# Patient Record
Sex: FEMALE
[De-identification: names, ages, dates, MRNs, and addresses within clinical notes are randomized per-mention and may not be internally consistent; named-entity substitution may affect disease eponyms.]

---

## 2018-12-30 ENCOUNTER — HOSPITAL ENCOUNTER (OUTPATIENT)
Dept: HOSPITAL 53 - M SFHCLERA | Age: 33
End: 2018-12-30
Attending: NURSE PRACTITIONER
Payer: COMMERCIAL

## 2018-12-30 DIAGNOSIS — J01.90: Primary | ICD-10-CM

## 2019-01-01 ENCOUNTER — HOSPITAL ENCOUNTER (EMERGENCY)
Dept: HOSPITAL 53 - M ED | Age: 34
Discharge: HOME | End: 2019-01-01
Payer: COMMERCIAL

## 2019-01-01 VITALS — DIASTOLIC BLOOD PRESSURE: 71 MMHG | SYSTOLIC BLOOD PRESSURE: 114 MMHG

## 2019-01-01 VITALS — HEIGHT: 69 IN | BODY MASS INDEX: 25.68 KG/M2 | WEIGHT: 173.37 LBS

## 2019-01-01 DIAGNOSIS — R22.0: Primary | ICD-10-CM

## 2019-01-01 NOTE — REP
Clinical:  Swelling.

 

Technique:  Axial noncontrast images through the facial bones to include the

mandible with coronal and sagittal re-formations.

 

Findings:

The osseous structures are intact and there is no evidence for fracture or

dislocation.  Specifically, the bilateral zygomatic arches, nasal bones, and

mandible including bilateral temporomandibular joints appear normal and

symmetric.  The sinuses and mastoid air cells are all well aerated and clear

without fluid level to suggest occult trauma.  The bilateral orbits including the

globes and intraconal contents appear symmetric and normal.  The surrounding soft

tissues are grossly unremarkable.

 

Impression:

Normal maxillofacial CT.

No evidence for acute pathology or sinus disease.

 

 

Electronically Signed by

Drake Manzo MD 01/01/2019 09:49 A

## 2019-08-23 ENCOUNTER — HOSPITAL ENCOUNTER (OUTPATIENT)
Dept: HOSPITAL 53 - M RAD | Age: 34
End: 2019-08-23
Attending: PHYSICIAN ASSISTANT
Payer: OTHER GOVERNMENT

## 2019-08-23 DIAGNOSIS — N64.52: Primary | ICD-10-CM

## 2019-08-23 NOTE — REP
BILATERAL MAMMOGRAM WITH RIGHT BREAST ULTRASOUND:

 

HISTORY:  Right nipple discharge, white and foul-smelling for the past 2 months

with pain upper outer quadrant right breast.

 

MLO and CC views of bilateral breasts performed. There are no prior studies for

comparison. No family history of breast cancer. Tyrer-zi lifetime risk of

breast cancer 12.1%.

 

Dense fibroglandular tissue is seen bilaterally in a heterogeneous pattern. No

discrete mass or architectural distortion is seen. No clustered

microcalcifications are seen.

 

Real-time sonographic evaluation of right retroareolar region performed to

evaluate for possible ductal abnormality as a reason for right nipple discharge.

Ultrasound also performed of the upper outer quadrant of the right breast where

there is pain. No discrete cystic or solid nodule is seen in these regions. Dense

fibroglandular tissue is seen.

 

IMPRESSION:

 

BIRADS 2: BI-RADS/ACR category 2 mammogram.  Benign Findings.

 

ACR 2 benign. No mass or clustered microcalcifications. Dense breast parenchyma

limits the sensitivity of the mammogram. No sonographic abnormality in the right

retroareolar region or upper outer quadrant of the right breast. Clinical

correlation and followup recommended.

 

This mammogram was interpreted with the aid of an FDA-approved computer-aided

detection system.

 

The patient states that she or he has not had a clinical breast exam in over a

year.

 

The patient letter being requested is M2.

 

 

Electronically Signed by

Srinivasa Chau MD 08/25/2019 10:51 P

## 2019-09-13 ENCOUNTER — HOSPITAL ENCOUNTER (OUTPATIENT)
Dept: HOSPITAL 53 - M SFHCWAGY | Age: 34
End: 2019-09-13
Attending: NURSE PRACTITIONER
Payer: OTHER GOVERNMENT

## 2019-09-13 DIAGNOSIS — Z12.4: Primary | ICD-10-CM

## 2019-09-13 PROCEDURE — 87624 HPV HI-RISK TYP POOLED RSLT: CPT

## 2019-09-16 LAB — HPV LOW VOL RFLX: (no result)

## 2020-01-22 ENCOUNTER — HOSPITAL ENCOUNTER (OUTPATIENT)
Dept: HOSPITAL 53 - M SFHCWAGY | Age: 35
End: 2020-01-22
Attending: NURSE PRACTITIONER
Payer: COMMERCIAL

## 2020-01-22 DIAGNOSIS — N90.89: Primary | ICD-10-CM

## 2020-06-24 ENCOUNTER — HOSPITAL ENCOUNTER (EMERGENCY)
Dept: HOSPITAL 53 - M ED | Age: 35
Discharge: HOME | End: 2020-06-24
Payer: OTHER GOVERNMENT

## 2020-06-24 VITALS — BODY MASS INDEX: 27.16 KG/M2 | HEIGHT: 69 IN | WEIGHT: 183.38 LBS

## 2020-06-24 VITALS — DIASTOLIC BLOOD PRESSURE: 74 MMHG | SYSTOLIC BLOOD PRESSURE: 122 MMHG

## 2020-06-24 DIAGNOSIS — F43.0: Primary | ICD-10-CM

## 2020-06-24 DIAGNOSIS — F17.200: ICD-10-CM

## 2020-06-24 LAB
APTT BLD: 32.4 SECONDS (ref 25–38.4)
BUN SERPL-MCNC: 4 MG/DL (ref 7–18)
CALCIUM SERPL-MCNC: 8.9 MG/DL (ref 8.5–10.1)
CHLORIDE SERPL-SCNC: 105 MEQ/L (ref 98–107)
CO2 SERPL-SCNC: 29 MEQ/L (ref 21–32)
CREAT SERPL-MCNC: 0.76 MG/DL (ref 0.55–1.3)
EOSINOPHIL NFR BLD MANUAL: 1 % (ref 0–3)
GFR SERPL CREATININE-BSD FRML MDRD: > 60 ML/MIN/{1.73_M2} (ref 60–?)
GLUCOSE SERPL-MCNC: 93 MG/DL (ref 70–100)
HCT VFR BLD AUTO: 37.9 % (ref 36–47)
HGB BLD-MCNC: 12.4 G/DL (ref 12–15.5)
INR PPP: 1.1
LYMPHOCYTES NFR BLD MANUAL: 40 % (ref 16–44)
MCH RBC QN AUTO: 30.4 PG (ref 27–33)
MCHC RBC AUTO-ENTMCNC: 32.7 G/DL (ref 32–36.5)
MCV RBC AUTO: 92.9 FL (ref 80–96)
MONOCYTES NFR BLD MANUAL: 5 % (ref 0–5)
NEUTROPHILS NFR BLD MANUAL: 54 % (ref 28–66)
PLATELET # BLD AUTO: 212 10^3/UL (ref 150–450)
PLATELET BLD QL SMEAR: NORMAL
POTASSIUM SERPL-SCNC: 3.9 MEQ/L (ref 3.5–5.1)
PROTHROMBIN TIME: 13.9 SECONDS (ref 11.8–14)
RBC # BLD AUTO: 4.08 10^6/UL (ref 4–5.4)
RBC MORPH BLD: NORMAL
SODIUM SERPL-SCNC: 135 MEQ/L (ref 136–145)
T4 FREE SERPL-MCNC: 0.91 NG/DL (ref 0.76–1.46)
TSH SERPL DL<=0.005 MIU/L-ACNC: 1.18 UIU/ML (ref 0.36–3.74)
WBC # BLD AUTO: 6.8 10^3/UL (ref 4–10)

## 2020-06-24 NOTE — REP
Portable chest x-ray:  Single view.

 

History:  Chest pain.

 

Findings:  Monitoring electrodes overlie the chest.  Lungs are well inflated and

pulmonary vasculature is not increased.  Pleural angles are sharp. No infiltrate

is seen.  No significant bony abnormality.

 

Impression:

 

Negative portable chest x-ray.

 

 

Electronically Signed by

Larry Wade MD 06/24/2020 02:46 P

## 2020-06-24 NOTE — ECGEPIP
[facility] - ED

                                       

                                       Test Date:    2020

Pat Name:     ZHOU DIALLO         Department:   

Patient ID:   [id number]                 Room:         -

Gender:       Female                   Technician:   lesley

:          1985               Requested By: BUBBA MENJIVAR

Order Number: [id number]     Reading MD:   Jermaine Cervantes

                                 Measurements

Intervals                              Axis          

Rate:         73                       P:            11

TN:           146                      QRS:          74

QRSD:         92                       T:            45

QT:           388                                    

QTc:          430                                    

                           Interpretive Statements

SINUS RHYTHM

POOR R WAVE PROGRESSION

NO PRIORS FOR COMPARISON

Electronically Signed on 2020 20:29:45 EDT by Jermaine Cervantes